# Patient Record
Sex: FEMALE | URBAN - METROPOLITAN AREA
[De-identification: names, ages, dates, MRNs, and addresses within clinical notes are randomized per-mention and may not be internally consistent; named-entity substitution may affect disease eponyms.]

---

## 2022-12-01 ENCOUNTER — EMERGENCY (EMERGENCY)
Facility: HOSPITAL | Age: 22
LOS: 1 days | Discharge: HOME | End: 2022-12-01
Admitting: EMERGENCY MEDICINE

## 2022-12-01 VITALS
RESPIRATION RATE: 19 BRPM | SYSTOLIC BLOOD PRESSURE: 107 MMHG | HEART RATE: 83 BPM | DIASTOLIC BLOOD PRESSURE: 65 MMHG | OXYGEN SATURATION: 100 % | TEMPERATURE: 99 F

## 2022-12-01 DIAGNOSIS — Z87.2 PERSONAL HISTORY OF DISEASES OF THE SKIN AND SUBCUTANEOUS TISSUE: Chronic | ICD-10-CM

## 2022-12-01 PROCEDURE — 99283 EMERGENCY DEPT VISIT LOW MDM: CPT

## 2022-12-01 PROCEDURE — 73562 X-RAY EXAM OF KNEE 3: CPT | Mod: 26,LT

## 2022-12-01 RX ORDER — IBUPROFEN 200 MG
600 TABLET ORAL ONCE
Refills: 0 | Status: COMPLETED | OUTPATIENT
Start: 2022-12-01 | End: 2022-12-01

## 2022-12-01 RX ADMIN — Medication 600 MILLIGRAM(S): at 14:36

## 2022-12-01 NOTE — ED PROVIDER NOTE - LOWER EXTREMITY EXAM, LEFT
normal Left knee: good ROM, able to flex at 90degree, no erythema, no edema, no ecchymosis, no clicks, no deformity, no crepitus, no bony tenderness, sensation intact. no leg swelling b/l, no calf tenderness b/l, no palpable cords b/l.

## 2022-12-01 NOTE — ED PROVIDER NOTE - CLINICAL SUMMARY MEDICAL DECISION MAKING FREE TEXT BOX
23 y/o F presents with L knee pain for 2 weeks. Benign physical exam.  Will do pain control and XR to r/o fracture/dislocation. 23 y/o F presents with L knee pain for 2 weeks. well appearing female. afebrile. ambulatory. able to weight bear. likely sprain.  Will do pain control and XR to r/o fracture/dislocation.

## 2022-12-01 NOTE — ED ADULT TRIAGE NOTE - CHIEF COMPLAINT QUOTE
Pt w/ hx of asthma c/o progressively worsening Left knee pain x 2 weeks made worse by use, Pt reports she noted swelling to left knee yesterday

## 2022-12-01 NOTE — ED PROVIDER NOTE - PHYSICAL EXAMINATION
Left knee: (-) deformity, (-) swelling, (-) erythema, (-) joint line tenderness, (-) anterior/posterior drawer.

## 2022-12-01 NOTE — ED PROVIDER NOTE - OBJECTIVE STATEMENT
23 y/o F w/ no PMH presents complaining of left knee pan for the past 2 weeks. 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 She states it hurts to walk on it, and hurts when she stands for a long time. She states she noticed her knee was more swollen yesterday, which prompted her to come in today. Denies f, ch, numbness, paresthesias, ankle/hip involvement. 21 y/o F w/ no PMH presents complaining of left knee pan for the past 2 weeks. 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 She states it hurts to walk on it, and hurts when she stands for a long time. She states she noticed her knee was more swollen yesterday, which prompted her to come in today. Denies fever, chills, numbness, paresthesias, ankle/hip involvement, calf pain, sob, recent travel, hx of DVT/PE, or any other complaints.

## 2022-12-01 NOTE — ED PROVIDER NOTE - NSFOLLOWUPINSTRUCTIONS_ED_ALL_ED_FT
Follow up with PMD within 48-72 hrs. Rest and elevate your knee.  Apply warm compress 20 minutes on 2-3 times/day as needed for pain or swelling. Take Motrin 600mg every 6-8 hrs with food as needed for pain.  Keep ace wrap on during the day for comfort and support. Use crutches or cane provided for you as needed to assist with ambulation.   Any worsening pain, swelling, weakness, numbness, redness, warmth, fever or any NEW CONCERNING symptoms return to the Emergency Dept.     Our Discharge Lounge will contact you for appointment with Orthopedic.

## 2022-12-01 NOTE — ED PROVIDER NOTE - PATIENT PORTAL LINK FT
You can access the FollowMyHealth Patient Portal offered by [facility] by registering at the following website: [email or web address]. By joining [facility]’s FollowMyHealth portal, you will also be able to view your health information using other applications (apps) compatible with our system.

## 2022-12-14 ENCOUNTER — OUTPATIENT (OUTPATIENT)
Dept: OUTPATIENT SERVICES | Facility: HOSPITAL | Age: 22
LOS: 1 days | End: 2022-12-14

## 2022-12-14 ENCOUNTER — APPOINTMENT (OUTPATIENT)
Dept: ORTHOPEDIC SURGERY | Facility: HOSPITAL | Age: 22
End: 2022-12-14

## 2022-12-14 VITALS
TEMPERATURE: 98.1 F | HEART RATE: 71 BPM | HEIGHT: 65 IN | DIASTOLIC BLOOD PRESSURE: 65 MMHG | SYSTOLIC BLOOD PRESSURE: 110 MMHG | WEIGHT: 126 LBS | BODY MASS INDEX: 20.99 KG/M2

## 2022-12-14 DIAGNOSIS — M25.562 PAIN IN LEFT KNEE: ICD-10-CM

## 2022-12-14 DIAGNOSIS — M70.52 OTHER BURSITIS OF KNEE, LEFT KNEE: ICD-10-CM

## 2022-12-14 DIAGNOSIS — Z87.2 PERSONAL HISTORY OF DISEASES OF THE SKIN AND SUBCUTANEOUS TISSUE: Chronic | ICD-10-CM

## 2022-12-14 PROBLEM — Z00.00 ENCOUNTER FOR PREVENTIVE HEALTH EXAMINATION: Status: ACTIVE | Noted: 2022-12-14

## 2022-12-16 PROBLEM — M25.562 KNEE PAIN, LEFT: Status: ACTIVE | Noted: 2022-12-16

## 2022-12-16 PROBLEM — M70.52 PES ANSERINUS BURSITIS OF LEFT KNEE: Status: ACTIVE | Noted: 2022-12-14

## 2023-01-13 ENCOUNTER — APPOINTMENT (OUTPATIENT)
Dept: OBGYN | Facility: CLINIC | Age: 23
End: 2023-01-13
Payer: MEDICAID

## 2023-01-13 VITALS
SYSTOLIC BLOOD PRESSURE: 103 MMHG | BODY MASS INDEX: 20.99 KG/M2 | HEART RATE: 68 BPM | DIASTOLIC BLOOD PRESSURE: 69 MMHG | WEIGHT: 126 LBS | HEIGHT: 65 IN

## 2023-01-13 DIAGNOSIS — Z01.419 ENCOUNTER FOR GYNECOLOGICAL EXAMINATION (GENERAL) (ROUTINE) W/OUT ABNORMAL FINDINGS: ICD-10-CM

## 2023-01-13 PROCEDURE — 99385 PREV VISIT NEW AGE 18-39: CPT

## 2023-01-13 NOTE — HISTORY OF PRESENT ILLNESS
[FreeTextEntry1] : 23 y/o  LMP 1/10/23 here for first Gyn exam.  Pt states she gets a vaginal odor at times when she wears silk underwear but not when she wears cotton underwear.  Pt with no other GI/ complaints.\par \par PMH - not sig\par PSH - breast cyst removal\par POb - not sig\par SH - neg x 2; occ ETOH\par FH - Mom with HTN\par \par +sexually active - last time was 6/2022\par \par Pt does not want contraception.\par +regular periods

## 2023-01-13 NOTE — PHYSICAL EXAM
[Chaperone Present] : A chaperone was present in the examining room during all aspects of the physical examination [Appropriately responsive] : appropriately responsive [Alert] : alert [No Acute Distress] : no acute distress [Soft] : soft [Non-tender] : non-tender [Non-distended] : non-distended [No Lesions] : no lesions [No Mass] : no mass [Oriented x3] : oriented x3 [Labia Majora] : normal [Labia Minora] : normal [Normal] : normal [Tenderness] : nontender [Enlarged ___ wks] : not enlarged [Mass ___ cm] : no uterine mass was palpated [Uterine Adnexae] : normal [FreeTextEntry4] : No discharge noted

## 2023-01-13 NOTE — DISCUSSION/SUMMARY
[FreeTextEntry1] : A/P Pt with nl Gyn exam.  Pt in stable condition.\par \par Will:\par \par 1) PAP, gc, chl done\par 2) VAginitis Cx done\par 3) RTC one year or prn\par 4) Pt told to use cotton and not silk underwear.\par \par ISABEL Marley

## 2023-01-14 LAB
C TRACH RRNA SPEC QL NAA+PROBE: NOT DETECTED
CANDIDA VAG CYTO: NOT DETECTED
G VAGINALIS+PREV SP MTYP VAG QL MICRO: NOT DETECTED
N GONORRHOEA RRNA SPEC QL NAA+PROBE: NOT DETECTED
SOURCE TP AMPLIFICATION: NORMAL
T VAGINALIS VAG QL WET PREP: NOT DETECTED

## 2023-01-18 LAB — CYTOLOGY CVX/VAG DOC THIN PREP: NORMAL
